# Patient Record
Sex: FEMALE | HISPANIC OR LATINO
[De-identification: names, ages, dates, MRNs, and addresses within clinical notes are randomized per-mention and may not be internally consistent; named-entity substitution may affect disease eponyms.]

---

## 2023-12-28 PROBLEM — Z00.00 ENCOUNTER FOR PREVENTIVE HEALTH EXAMINATION: Status: ACTIVE | Noted: 2023-12-28

## 2024-01-02 ENCOUNTER — APPOINTMENT (OUTPATIENT)
Dept: BARIATRICS | Facility: CLINIC | Age: 54
End: 2024-01-02

## 2024-02-06 ENCOUNTER — NON-APPOINTMENT (OUTPATIENT)
Age: 54
End: 2024-02-06

## 2024-02-06 ENCOUNTER — APPOINTMENT (OUTPATIENT)
Dept: BARIATRICS | Facility: CLINIC | Age: 54
End: 2024-02-06
Payer: MEDICARE

## 2024-02-06 VITALS
OXYGEN SATURATION: 99 % | BODY MASS INDEX: 30.93 KG/M2 | DIASTOLIC BLOOD PRESSURE: 82 MMHG | HEART RATE: 113 BPM | TEMPERATURE: 97.2 F | SYSTOLIC BLOOD PRESSURE: 122 MMHG | WEIGHT: 163.8 LBS | HEIGHT: 61 IN

## 2024-02-06 DIAGNOSIS — E78.5 HYPERLIPIDEMIA, UNSPECIFIED: ICD-10-CM

## 2024-02-06 DIAGNOSIS — Z86.39 PERSONAL HISTORY OF OTHER ENDOCRINE, NUTRITIONAL AND METABOLIC DISEASE: ICD-10-CM

## 2024-02-06 DIAGNOSIS — Z78.9 OTHER SPECIFIED HEALTH STATUS: ICD-10-CM

## 2024-02-06 DIAGNOSIS — R63.2 POLYPHAGIA: ICD-10-CM

## 2024-02-06 DIAGNOSIS — I10 ESSENTIAL (PRIMARY) HYPERTENSION: ICD-10-CM

## 2024-02-06 DIAGNOSIS — E78.00 PURE HYPERCHOLESTEROLEMIA, UNSPECIFIED: ICD-10-CM

## 2024-02-06 DIAGNOSIS — E66.9 OBESITY, UNSPECIFIED: ICD-10-CM

## 2024-02-06 DIAGNOSIS — E46 UNSPECIFIED PROTEIN-CALORIE MALNUTRITION: ICD-10-CM

## 2024-02-06 DIAGNOSIS — R63.8 OTHER SYMPTOMS AND SIGNS CONCERNING FOOD AND FLUID INTAKE: ICD-10-CM

## 2024-02-06 DIAGNOSIS — R63.5 ABNORMAL WEIGHT GAIN: ICD-10-CM

## 2024-02-06 PROCEDURE — 99204 OFFICE O/P NEW MOD 45 MIN: CPT

## 2024-02-06 RX ORDER — TOPIRAMATE 25 MG/1
25 TABLET, FILM COATED ORAL
Qty: 30 | Refills: 0 | Status: COMPLETED | COMMUNITY
Start: 2024-02-06 | End: 2024-03-07

## 2024-02-06 RX ORDER — PHENTERMINE HYDROCHLORIDE 37.5 MG/1
37.5 TABLET ORAL DAILY
Qty: 15 | Refills: 0 | Status: COMPLETED | COMMUNITY
Start: 2024-02-06 | End: 2024-03-07

## 2024-02-06 RX ORDER — SIMVASTATIN 20 MG/1
20 TABLET, FILM COATED ORAL DAILY
Refills: 0 | Status: ACTIVE | COMMUNITY

## 2024-02-06 RX ORDER — VENLAFAXINE HYDROCHLORIDE 150 MG/1
150 CAPSULE, EXTENDED RELEASE ORAL DAILY
Refills: 0 | Status: ACTIVE | COMMUNITY

## 2024-02-06 RX ORDER — TAPENTADOL HYDROCHLORIDE 100 MG/1
100 TABLET, FILM COATED ORAL DAILY
Refills: 0 | Status: ACTIVE | COMMUNITY

## 2024-02-06 RX ORDER — ENALAPRIL MALEATE 20 MG/1
20 TABLET ORAL DAILY
Refills: 0 | Status: ACTIVE | COMMUNITY

## 2024-02-06 NOTE — ASSESSMENT
[FreeTextEntry1] : CLINTON RIVERS is a 53 year-old F with a long-standing h/o obesity, who presents today for initial evaluation for weight management options.   I had a lengthy discussion with the patient regarding nutrition, exercise, weight loss medications, and bariatric surgery. The patient qualifies for and is most interested in weight loss medications. She does not qualify for bariatric surgery with current BMI,   Health maintenance and behavioral/nutrition counseling for obesity: An additional 30 minutes of the visit was spent counseling the patient regarding need for aggressive weight loss and behavior modification including nutrition and proper eating habits, including which foods to eat and avoid.   I emphasized the importance of making healthier food choices including fresh fruits and vegetables, lean meats, and protein sources. I recommended front loading calories, incorporating whole grains, and eliminating fast foods. I also discussed the importance of avoiding fried/fatty foods and foods containing high sugar content including juices/shakes/sodas/desserts.   I also encouraged beginning an exercise program and recommended cardiovascular exercises along with strength training to build lean muscle. I made suggestions on different types of exercises to try.   Patient will work on the following:  -Meet with nutritionist  -Have recent blood work faxed to this office  -Eliminate snacks  -Focus on eating 3 well-balanced meals during the daytime with appropriate portion size  -Cooking fresh meals rather than take out/processed/ready-made foods  -Incorporating exercise; walking 8-10k steps per day and strength training exercises.   -Start Phentermine-Topiramate based on authorization and labs.   I have discussed initiating Qsymia (i.e Phentermine-Topiramate) therapy for pt. All risks and benefits have been discussed with the patient. Pt verbalizes understanding and wishes to proceed with medical therapy. Instructions for use provided. Currently there are no contraindications for the use of phentermine-topiramate after reviewing pts medical history and labs including CAD, arrhythmias, severe anxiety and does not have a history of substance abuse or glaucoma. Possible side effects were discussed with the patient including increased anxiety, tachycardia, elevated BP and teratogenicity in case of pregnancy.   Patient educated to call with questions/concerns All questions answered Return to office 3 weeks after starting Phentermine-Topiramate; call the office right away with any side effects   Pt verbalized understanding of the above   Additional time spent before and after visit reviewing chart.   Pt seen with Dr Olmos
Implemented All Universal Safety Interventions:  [place] to call system. Call bell, personal items and telephone within reach. Instruct patient to call for assistance. Room bathroom lighting operational. Non-slip footwear when patient is off stretcher. Physically safe environment: no spills, clutter or unnecessary equipment. Stretcher in lowest position, wheels locked, appropriate side rails in place.

## 2024-02-06 NOTE — PHYSICAL EXAM
[Obese, well nourished, in no acute distress] : obese, well nourished, in no acute distress [Normal] : full range of motion and no deformities appreciated [de-identified] : Equal chest rise, non-labored respirations, no audible wheezing. [de-identified] : soft, NT, ND, no evidence of hernia or diastasis; obese

## 2024-02-06 NOTE — HISTORY OF PRESENT ILLNESS
[de-identified] : CLINTON RIVERS is a 53-year-old F with a long-standing Hx of obesity presents today for initial evaluation for weight management options.   Reports no history of substance abuse, uncontrolled blood pressure, kidney stones, or pancreatitis; the pt is on Nucynta for back pain (opioid) and reports a PH of significant anxiety. Reports no family history of thyroid cancer or MEN 2 syndrome.   Heaviest/current wt 175 lbs, lowest wt 130 lbs. Goal weight: 130 lbs Diets/exercise programs tried in the past: Yes (e.g. low fat; low carbohydrate) Weight loss medications tried in the past: None Reason for stopping weight loss medication if applicable: N/A   History of bariatric surgery: No Obesity comorbidities: HTN Comorbidities improved/resolved: N/A Anti-obesity medication: None Obesity medication side effects: N/A   No abdominal pain, reflux, constipation, or diarrhea; reports vomiting yesterday (Monday) with Sunday hospitalization for dizziness and vomiting; reports vertigo diagnosis.   Current dietary lifestyle: Breakfast: Oatmeal, toast, eggs Lunch: Tuna fish sandwich or chicken, turkey and salad Dinner: Roasted chicken or fish with vegetables or salad Snacks: Bananas and oranges (2 per day) Drinks: Water (64 oz/day)   Activity Lifestyle: Sleep: 7-8 hrs Physical activity/exercise: Walking, weightlifting, dancing (exercise limited by back pain) Work: Not currently working Smoking/ETOH: Nonsmoker; no ETOH use

## 2024-02-28 ENCOUNTER — APPOINTMENT (OUTPATIENT)
Dept: BARIATRICS | Facility: CLINIC | Age: 54
End: 2024-02-28